# Patient Record
Sex: MALE | Race: WHITE | NOT HISPANIC OR LATINO | URBAN - METROPOLITAN AREA
[De-identification: names, ages, dates, MRNs, and addresses within clinical notes are randomized per-mention and may not be internally consistent; named-entity substitution may affect disease eponyms.]

---

## 2022-07-07 ENCOUNTER — OFFICE (OUTPATIENT)
Dept: URBAN - METROPOLITAN AREA CLINIC 105 | Facility: CLINIC | Age: 57
End: 2022-07-07

## 2022-07-07 VITALS
DIASTOLIC BLOOD PRESSURE: 72 MMHG | HEART RATE: 64 BPM | SYSTOLIC BLOOD PRESSURE: 138 MMHG | OXYGEN SATURATION: 96 % | WEIGHT: 198 LBS | HEIGHT: 71 IN

## 2022-07-07 DIAGNOSIS — Z86.010 PERSONAL HISTORY OF COLONIC POLYPS: ICD-10-CM

## 2022-07-07 DIAGNOSIS — R19.5 OTHER FECAL ABNORMALITIES: ICD-10-CM

## 2022-07-07 PROCEDURE — 99203 OFFICE O/P NEW LOW 30 MIN: CPT

## 2022-07-07 RX ORDER — SODIUM PICOSULFATE, MAGNESIUM OXIDE, AND ANHYDROUS CITRIC ACID 10; 3.5; 12 MG/160ML; G/160ML; G/160ML
LIQUID ORAL
Qty: 1 | Refills: 0 | Status: COMPLETED
Start: 2022-07-07 | End: 2022-12-27

## 2022-07-07 NOTE — SERVICENOTES
-ordered colonoscopy for colon polyp surveillance
-ordered blood test for celiac disease
-if you have loose stools/diarrhea, you can take loperamide (Imodium) as needed
-will obtain records from prior colonoscopies in [place]

## 2022-07-07 NOTE — SERVICEHPINOTES
57-year-old man who presents for evaluation of personal history of colon polyps.  Patient last had a colonoscopy 5/2017 with a hyperplastic polyp that was removed.  He has also had prior colonoscopies in [place] in which polyps were removed (report not available for review).  He reports having looser bowel movements which he attributes to his diet.  He has approximately 4 loose bowel movements per day.  He denies abdominal pain, constipation, stool, black stool, weight loss, heartburn or dysphagia.  He occasionally takes Tylenol and Advil for pain.  He denies family history of celiac disease, inflammatory bowel disease or colon cancer.

## 2022-08-09 ENCOUNTER — AMBULATORY SURGICAL CENTER (OUTPATIENT)
Dept: URBAN - METROPOLITAN AREA SURGERY 26 | Facility: SURGERY | Age: 57
End: 2022-08-09

## 2022-08-09 ENCOUNTER — OFFICE (OUTPATIENT)
Dept: URBAN - METROPOLITAN AREA PATHOLOGY 24 | Facility: PATHOLOGY | Age: 57
End: 2022-08-09

## 2022-08-09 DIAGNOSIS — R19.7 DIARRHEA, UNSPECIFIED: ICD-10-CM

## 2022-08-09 DIAGNOSIS — D12.4 BENIGN NEOPLASM OF DESCENDING COLON: ICD-10-CM

## 2022-08-09 PROCEDURE — 88305 TISSUE EXAM BY PATHOLOGIST: CPT | Performed by: PATHOLOGY

## 2022-08-09 PROCEDURE — 45380 COLONOSCOPY AND BIOPSY: CPT | Mod: 59

## 2022-08-09 PROCEDURE — 45385 COLONOSCOPY W/LESION REMOVAL: CPT

## 2022-08-09 PROCEDURE — 88342 IMHCHEM/IMCYTCHM 1ST ANTB: CPT | Performed by: PATHOLOGY

## 2022-12-27 ENCOUNTER — OFFICE (OUTPATIENT)
Dept: URBAN - METROPOLITAN AREA CLINIC 105 | Facility: CLINIC | Age: 57
End: 2022-12-27

## 2022-12-27 VITALS
WEIGHT: 197 LBS | DIASTOLIC BLOOD PRESSURE: 80 MMHG | HEIGHT: 71 IN | HEART RATE: 69 BPM | OXYGEN SATURATION: 96 % | SYSTOLIC BLOOD PRESSURE: 136 MMHG

## 2022-12-27 DIAGNOSIS — J30.2 OTHER SEASONAL ALLERGIC RHINITIS: ICD-10-CM

## 2022-12-27 DIAGNOSIS — Z86.010 PERSONAL HISTORY OF COLONIC POLYPS: ICD-10-CM

## 2022-12-27 DIAGNOSIS — R19.7 DIARRHEA, UNSPECIFIED: ICD-10-CM

## 2022-12-27 DIAGNOSIS — K52.89 OTHER SPECIFIED NONINFECTIVE GASTROENTERITIS AND COLITIS: ICD-10-CM

## 2022-12-27 PROCEDURE — 99213 OFFICE O/P EST LOW 20 MIN: CPT

## 2022-12-27 NOTE — SERVICEHPINOTES
Moisés Graham   is seen today for a follow-up visit. Patient presents for follow-up of loose stools/diarrhea and personal history of colon polyps. Patient was initially seen in clinic 7/7/2022 for evaluation for colonoscopy for personal history of colon polyps. He had reported looser bowel movements that he attributed to his diet. He had reported 4 loose bowel movements per day at that time. He had a blood test for celiac disease that was negative. He had a colonoscopy 8/9/2022 with normal terminal ileum, 4 polyps, sigmoid diverticulosis and internal hemorrhoids. 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 Colon polyps were 1 tubular adenoma, 2 benign lymphoid polyps and 2 fragments of edematous colonic mucosa. Patient was advised to repeat colonoscopy in 5 years. Patient was advised to take loperamide as needed for diarrhea. 
br
brPatient states that he continues to have some loose stool when he drinks wine. He typically has symptoms if he drinks more than 4 drinks in one setting, which he typically does 4 to 5 days/week. He denies blood in stool. He does not take anything for loose stools and does not feel that his symptoms are debilitating. He denies weight loss. He follows with Dr. Luci Son–allergist and states that his allergies and asthma are well controlled. He takes Allegra and Flonase during allergy season in spring and fall. He also reports environmental allergies to cats, dust, horses and tree pollen. His asthma is well controlled with Advair and montelukast.    br

## 2022-12-27 NOTE — SERVICENOTES
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 Luci Son–allergist
– Limit wine and alcohol to no more than 2 drinks daily
– Try loperamide (Imodium) as needed for diarrhea/loose stools
– We should repeat your colonoscopy 8/2027 for colon polyp surveillance
– Follow-up as needed